# Patient Record
Sex: FEMALE | Race: WHITE | NOT HISPANIC OR LATINO | Employment: FULL TIME | URBAN - NONMETROPOLITAN AREA
[De-identification: names, ages, dates, MRNs, and addresses within clinical notes are randomized per-mention and may not be internally consistent; named-entity substitution may affect disease eponyms.]

---

## 2019-03-27 ENCOUNTER — OFFICE VISIT (OUTPATIENT)
Dept: URGENT CARE | Facility: PHYSICIAN GROUP | Age: 29
End: 2019-03-27
Payer: COMMERCIAL

## 2019-03-27 VITALS
BODY MASS INDEX: 20.76 KG/M2 | OXYGEN SATURATION: 99 % | RESPIRATION RATE: 16 BRPM | TEMPERATURE: 99.3 F | SYSTOLIC BLOOD PRESSURE: 114 MMHG | HEIGHT: 68 IN | WEIGHT: 137 LBS | HEART RATE: 82 BPM | DIASTOLIC BLOOD PRESSURE: 76 MMHG

## 2019-03-27 DIAGNOSIS — J02.9 ACUTE PHARYNGITIS, UNSPECIFIED ETIOLOGY: ICD-10-CM

## 2019-03-27 DIAGNOSIS — Z20.818 EXPOSURE TO STREP THROAT: ICD-10-CM

## 2019-03-27 LAB
INT CON NEG: NEGATIVE
INT CON POS: POSITIVE
S PYO AG THROAT QL: NEGATIVE

## 2019-03-27 PROCEDURE — 99203 OFFICE O/P NEW LOW 30 MIN: CPT | Performed by: FAMILY MEDICINE

## 2019-03-27 PROCEDURE — 87880 STREP A ASSAY W/OPTIC: CPT | Performed by: FAMILY MEDICINE

## 2019-03-27 RX ORDER — AZITHROMYCIN 250 MG/1
TABLET, FILM COATED ORAL
Qty: 6 TAB | Refills: 0 | Status: SHIPPED | OUTPATIENT
Start: 2019-03-27

## 2019-03-27 NOTE — LETTER
March 27, 2019         Patient: Alyson Shea   YOB: 1990   Date of Visit: 3/27/2019           To Whom it May Concern:    Alyson Shea was seen in my clinic on 3/27/2019.     Please excuse from work for 3/28/19 due to medical condition.    If you have any questions or concerns, please don't hesitate to call.        Sincerely,           Viral Reid M.D.  Electronically Signed

## 2019-03-28 NOTE — PROGRESS NOTES
Chief Complaint:    Chief Complaint   Patient presents with   • Laryngitis   • Pharyngitis       History of Present Illness:    This is a new problem. She started with sore throat and voice hoarseness on 3/23/19, overall at least moderate severity, and not getting better. She is in very close contact with co-worker who tested positive for Strep throat yesterday. [employer] works/tolerates.      Review of Systems:    Constitutional: Negative for fever, chills, and diaphoresis.   Eyes: Negative for change in vision, photophobia, pain, redness, and discharge.  ENT: See HPI.    Respiratory: Negative for cough, hemoptysis, sputum production, shortness of breath, wheezing, and stridor.    Cardiovascular: Negative for chest pain, palpitations, orthopnea, claudication, leg swelling, and PND.   Gastrointestinal: Negative for abdominal pain, nausea, vomiting, diarrhea, constipation, blood in stool, and melena.   Musculoskeletal: Negative for myalgias, joint pain, neck pain, and back pain.   Skin: Negative for rash and itching.   Neurological: Negative for dizziness, tingling, tremors, sensory change, speech change, focal weakness, seizures, loss of consciousness, and headaches.       Past Medical History:    History reviewed. No pertinent past medical history.    Past Surgical History:    History reviewed. No pertinent surgical history.    Social History:    Social History     Social History   • Marital status: Single     Spouse name: N/A   • Number of children: N/A   • Years of education: N/A     Occupational History   • Not on file.     Social History Main Topics   • Smoking status: Never Smoker   • Smokeless tobacco: Never Used   • Alcohol use Yes      Comment: occ   • Drug use: No   • Sexual activity: Not on file     Other Topics Concern   • Not on file     Social History Narrative   • No narrative on file     Family History:    History reviewed. No pertinent family history.    Medications:    No current outpatient  "prescriptions on file prior to visit.     No current facility-administered medications on file prior to visit.        Allergies:    Allergies   Allergen Reactions   • Amoxicillin    • Codeine    • Flexeril [Cyclobenzaprine]    • Pcn [Penicillins]        Vitals:    Vitals:    03/27/19 1756   BP: 114/76   Pulse: 82   Resp: 16   Temp: 37.4 °C (99.3 °F)   TempSrc: Temporal   SpO2: 99%   Weight: 62.1 kg (137 lb)   Height: 1.727 m (5' 8\")       Physical Exam:    Constitutional: Vital signs reviewed. Appears well-developed and well-nourished. Hoarse. No acute distress.   Eyes: Sclera white, conjunctivae clear.   ENT: External ears normal. Hearing normal. Nasal mucosa pink. Lips/teeth are normal. Oral mucosa pink and moist. Posterior pharynx is moderately erythematous without exudate.  Pulmonary/Chest: Respirations non-labored.   Lymph: Cervical nodes without tenderness or enlargement.  Musculoskeletal: Normal gait. Normal range of motion. No muscular atrophy or weakness.  Skin: No rashes or lesions. Warm, dry, normal turgor.  Psychiatric: Normal mood and affect. Behavior is normal. Judgment and thought content normal.     Diagnostics:    POCT RAPID STREP A (Order #[id number]) on 3/27/19   Component Results     Component   Rapid Strep Screen   negative    Internal Control Positive   Positive    Internal Control Negative   Negative    Last Resulted Time   Wed Mar 27, 2019  7:03 PM       Assessment / Plan:    1. Acute pharyngitis, unspecified etiology  - POCT Rapid Strep A  - azithromycin (ZITHROMAX) 250 MG Tab; 2 TABS BY MOUTH ON DAY 1, 1 TAB ON DAYS 2-5.  Dispense: 6 Tab; Refill: 0    2. Exposure to strep throat  - POCT Rapid Strep A  - azithromycin (ZITHROMAX) 250 MG Tab; 2 TABS BY MOUTH ON DAY 1, 1 TAB ON DAYS 2-5.  Dispense: 6 Tab; Refill: 0      Work note given - excuse for 3/28/19.    Discussed with her limitations of Rapid Strep test (possible false negative, only testing for Strep A), DDX, management options, and " risks, benefits, and alternatives to treatment plan agreed upon.    Despite negative Rapid Strep test, offered antibiotic treatment due to duration and severity of symptoms, exam findings, and exposed to co-worker who tested positive for Strep throat yesterday and she has very close contact with this person. She would like.    Agreeable to medication prescribed.    Discussed expected course of duration, time for improvement, and to seek follow-up in Emergency Room, urgent care, or with PCP if getting worse at any time or not improving within expected time frame.

## 2019-11-22 ENCOUNTER — OFFICE VISIT (OUTPATIENT)
Dept: URGENT CARE | Facility: PHYSICIAN GROUP | Age: 29
End: 2019-11-22
Payer: COMMERCIAL

## 2019-11-22 ENCOUNTER — APPOINTMENT (OUTPATIENT)
Dept: RADIOLOGY | Facility: IMAGING CENTER | Age: 29
End: 2019-11-22
Attending: NURSE PRACTITIONER
Payer: COMMERCIAL

## 2019-11-22 VITALS
TEMPERATURE: 97.5 F | RESPIRATION RATE: 14 BRPM | OXYGEN SATURATION: 96 % | BODY MASS INDEX: 22.43 KG/M2 | WEIGHT: 148 LBS | SYSTOLIC BLOOD PRESSURE: 118 MMHG | DIASTOLIC BLOOD PRESSURE: 80 MMHG | HEIGHT: 68 IN | HEART RATE: 100 BPM

## 2019-11-22 DIAGNOSIS — R68.84 JAW PAIN: ICD-10-CM

## 2019-11-22 DIAGNOSIS — M25.512 CHRONIC LEFT SHOULDER PAIN: ICD-10-CM

## 2019-11-22 DIAGNOSIS — G89.29 CHRONIC LEFT SHOULDER PAIN: ICD-10-CM

## 2019-11-22 PROCEDURE — 99214 OFFICE O/P EST MOD 30 MIN: CPT | Performed by: NURSE PRACTITIONER

## 2019-11-22 PROCEDURE — 70100 X-RAY EXAM OF JAW <4VIEWS: CPT | Mod: TC,FY,RT | Performed by: NURSE PRACTITIONER

## 2019-11-22 PROCEDURE — 73030 X-RAY EXAM OF SHOULDER: CPT | Mod: TC,FY,LT | Performed by: NURSE PRACTITIONER

## 2019-11-22 NOTE — PROGRESS NOTES
Subjective:      Alyson Shea is a 29 y.o. female who presents with Jaw Pain (poss dislocation right side x1 week) and Shoulder Pain (poss dislocation x 2 years )    History reviewed. No pertinent past medical history.  Social History     Socioeconomic History   • Marital status: Single     Spouse name: Not on file   • Number of children: Not on file   • Years of education: Not on file   • Highest education level: Not on file   Occupational History   • Not on file   Social Needs   • Financial resource strain: Not on file   • Food insecurity:     Worry: Not on file     Inability: Not on file   • Transportation needs:     Medical: Not on file     Non-medical: Not on file   Tobacco Use   • Smoking status: Never Smoker   • Smokeless tobacco: Never Used   Substance and Sexual Activity   • Alcohol use: Yes     Comment: occ   • Drug use: No   • Sexual activity: Not on file   Lifestyle   • Physical activity:     Days per week: Not on file     Minutes per session: Not on file   • Stress: Not on file   Relationships   • Social connections:     Talks on phone: Not on file     Gets together: Not on file     Attends [employer] service: Not on file     Active member of club or organization: Not on file     Attends meetings of clubs or organizations: Not on file     Relationship status: Not on file   • Intimate partner violence:     Fear of current or ex partner: Not on file     Emotionally abused: Not on file     Physically abused: Not on file     Forced sexual activity: Not on file   Other Topics Concern   • Not on file   Social History Narrative   • Not on file     History reviewed. No pertinent family history.    Allergies: Amoxicillin; Codeine; Flexeril [cyclobenzaprine]; and Pcn [penicillins]    Patient is a 29-year-old female who presents today with 2 complaints.  First complaint is chronic left shoulder pain over the last 2 years.  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  States that she was doing a cartwheel and thinks she injured her shoulder at that time.  That was 2 years ago and she is continued to have intermittent pain since.    Secondly, over the last 2 to 3 weeks patient has been having right jaw pain.  States that she is concerned for injury versus dislocation.  No trauma, states she was eating a hard shell taco and felt a crack in her jaw and has had pain since.          Shoulder Pain   This is a chronic problem. The current episode started more than 1 year ago. The problem occurs constantly. The problem has been unchanged. Nothing aggravates the symptoms. She has tried nothing for the symptoms. The treatment provided no relief.       Review of Systems   Musculoskeletal:        Jaw pain  Shoulder pain   All other systems reviewed and are negative.         Objective:     /80 (BP Location: Left arm, Patient Position: Sitting, BP Cuff Size: Adult)   Pulse 100   Temp 36.4 °C (97.5 °F) (Temporal)   Resp 14   Ht 1.727 m (5' 8\")   Wt 67.1 kg (148 lb)   SpO2 96%   BMI 22.50 kg/m²      Physical Exam  Vitals signs reviewed.   Constitutional:       Appearance: Normal appearance.   HENT:      Head:        Comments: Localized STS and point tenderness.  Mild pain with biting down.  No obvious deformity.   Neck:      Musculoskeletal: Normal range of motion and neck supple.   Musculoskeletal:        Arms:       Comments: Mild point tenderness to the anterior and upper posterior left shoulder.  No obvious deformity or dislocation noted.   5/5 and equal in the upper extremities.  Push/pull 5/5 and equal in the upper extremities.  Patient is not able to abduct greater than 90 degrees anteriorly or laterally.  No decreased range of motion posteriorly.   Neurological:      Mental Status: She is alert.       xr shoulder:    11/22/2019 3:31 PM     HISTORY/REASON FOR EXAM:  Atraumatic Pain/Swelling/Deformity        TECHNIQUE/EXAM DESCRIPTION AND " NUMBER OF VIEWS:  3 views of the LEFT shoulder.     COMPARISON: None     FINDINGS:  There is no evidence of fracture or dislocation. Glenohumeral and acromioclavicular articulation is maintained.  No periosteal new bone formation or osseous erosion is identified. Visualized left lung field is clear.        IMPRESSION:     No evidence of acute fracture or dislocation.    xr jaw:            11/22/2019 3:31 PM     HISTORY/REASON FOR EXAM:  Jaw Pain.        TECHNIQUE/EXAM DESCRIPTION AND NUMBER OF VIEWS:  3 view(s) of the mandible.     COMPARISON:  None.     FINDINGS:  No fracture or dislocation is identified. No lytic or sclerotic lesions are identified. Mastoid air cells are well aerated. There is no significant deviation of the nasal septum.     IMPRESSION:     No acute abnormality is identified.     If pain persists, further evaluation can be obtained with MRI of the TMJ.       Assessment/Plan:     Chronic left shoulder pain  Right jaw pain    Ibuprofen as needed  Rest  MRI of shoulder ordered  Referral given to orthopedics at patient's request.  There are no diagnoses linked to this encounter.

## 2019-12-10 ENCOUNTER — HOSPITAL ENCOUNTER (OUTPATIENT)
Dept: RADIOLOGY | Facility: MEDICAL CENTER | Age: 29
End: 2019-12-10
Attending: NURSE PRACTITIONER
Payer: COMMERCIAL

## 2019-12-10 DIAGNOSIS — M25.512 CHRONIC LEFT SHOULDER PAIN: ICD-10-CM

## 2019-12-10 DIAGNOSIS — G89.29 CHRONIC LEFT SHOULDER PAIN: ICD-10-CM

## 2019-12-10 PROCEDURE — 73221 MRI JOINT UPR EXTREM W/O DYE: CPT | Mod: LT

## 2023-06-08 ENCOUNTER — HOSPITAL ENCOUNTER (EMERGENCY)
Facility: MEDICAL CENTER | Age: 33
End: 2023-06-08
Attending: EMERGENCY MEDICINE
Payer: COMMERCIAL

## 2023-06-08 ENCOUNTER — APPOINTMENT (OUTPATIENT)
Dept: RADIOLOGY | Facility: MEDICAL CENTER | Age: 33
End: 2023-06-08
Attending: EMERGENCY MEDICINE
Payer: COMMERCIAL

## 2023-06-08 VITALS
WEIGHT: 158.51 LBS | DIASTOLIC BLOOD PRESSURE: 66 MMHG | HEIGHT: 68 IN | OXYGEN SATURATION: 96 % | HEART RATE: 76 BPM | SYSTOLIC BLOOD PRESSURE: 116 MMHG | TEMPERATURE: 98 F | BODY MASS INDEX: 24.02 KG/M2 | RESPIRATION RATE: 20 BRPM

## 2023-06-08 DIAGNOSIS — R11.2 NAUSEA AND VOMITING, UNSPECIFIED VOMITING TYPE: ICD-10-CM

## 2023-06-08 DIAGNOSIS — R10.9 FLANK PAIN: ICD-10-CM

## 2023-06-08 DIAGNOSIS — R10.31 RIGHT LOWER QUADRANT ABDOMINAL PAIN: ICD-10-CM

## 2023-06-08 DIAGNOSIS — N20.0 NEPHROLITHIASIS: Primary | ICD-10-CM

## 2023-06-08 LAB
ALBUMIN SERPL BCP-MCNC: 4.4 G/DL (ref 3.2–4.9)
ALBUMIN/GLOB SERPL: 1.5 G/DL
ALP SERPL-CCNC: 71 U/L (ref 30–99)
ALT SERPL-CCNC: 14 U/L (ref 2–50)
AMORPH CRY #/AREA URNS HPF: PRESENT /HPF
ANION GAP SERPL CALC-SCNC: 15 MMOL/L (ref 7–16)
APPEARANCE UR: ABNORMAL
AST SERPL-CCNC: 12 U/L (ref 12–45)
BACTERIA #/AREA URNS HPF: ABNORMAL /HPF
BASOPHILS # BLD AUTO: 0.4 % (ref 0–1.8)
BASOPHILS # BLD: 0.03 K/UL (ref 0–0.12)
BILIRUB SERPL-MCNC: 0.5 MG/DL (ref 0.1–1.5)
BILIRUB UR QL STRIP.AUTO: NEGATIVE
BUN SERPL-MCNC: 16 MG/DL (ref 8–22)
CALCIUM ALBUM COR SERPL-MCNC: 9 MG/DL (ref 8.5–10.5)
CALCIUM SERPL-MCNC: 9.3 MG/DL (ref 8.5–10.5)
CHLORIDE SERPL-SCNC: 104 MMOL/L (ref 96–112)
CO2 SERPL-SCNC: 21 MMOL/L (ref 20–33)
COLOR UR: YELLOW
CREAT SERPL-MCNC: 0.86 MG/DL (ref 0.5–1.4)
EOSINOPHIL # BLD AUTO: 0.01 K/UL (ref 0–0.51)
EOSINOPHIL NFR BLD: 0.1 % (ref 0–6.9)
ERYTHROCYTE [DISTWIDTH] IN BLOOD BY AUTOMATED COUNT: 40.1 FL (ref 35.9–50)
GFR SERPLBLD CREATININE-BSD FMLA CKD-EPI: 91 ML/MIN/1.73 M 2
GLOBULIN SER CALC-MCNC: 2.9 G/DL (ref 1.9–3.5)
GLUCOSE SERPL-MCNC: 114 MG/DL (ref 65–99)
GLUCOSE UR STRIP.AUTO-MCNC: NEGATIVE MG/DL
HCG SERPL QL: NEGATIVE
HCT VFR BLD AUTO: 42.6 % (ref 37–47)
HGB BLD-MCNC: 13.8 G/DL (ref 12–16)
IMM GRANULOCYTES # BLD AUTO: 0.01 K/UL (ref 0–0.11)
IMM GRANULOCYTES NFR BLD AUTO: 0.1 % (ref 0–0.9)
KETONES UR STRIP.AUTO-MCNC: NEGATIVE MG/DL
LEUKOCYTE ESTERASE UR QL STRIP.AUTO: NEGATIVE
LIPASE SERPL-CCNC: 29 U/L (ref 11–82)
LYMPHOCYTES # BLD AUTO: 1.48 K/UL (ref 1–4.8)
LYMPHOCYTES NFR BLD: 20 % (ref 22–41)
MCH RBC QN AUTO: 29.3 PG (ref 27–33)
MCHC RBC AUTO-ENTMCNC: 32.4 G/DL (ref 32.2–35.5)
MCV RBC AUTO: 90.4 FL (ref 81.4–97.8)
MICRO URNS: ABNORMAL
MONOCYTES # BLD AUTO: 0.48 K/UL (ref 0–0.85)
MONOCYTES NFR BLD AUTO: 6.5 % (ref 0–13.4)
NEUTROPHILS # BLD AUTO: 5.39 K/UL (ref 1.82–7.42)
NEUTROPHILS NFR BLD: 72.9 % (ref 44–72)
NITRITE UR QL STRIP.AUTO: NEGATIVE
NRBC # BLD AUTO: 0 K/UL
NRBC BLD-RTO: 0 /100 WBC (ref 0–0.2)
PH UR STRIP.AUTO: 8.5 [PH] (ref 5–8)
PLATELET # BLD AUTO: 311 K/UL (ref 164–446)
PMV BLD AUTO: 9.7 FL (ref 9–12.9)
POTASSIUM SERPL-SCNC: 3.9 MMOL/L (ref 3.6–5.5)
PROT SERPL-MCNC: 7.3 G/DL (ref 6–8.2)
PROT UR QL STRIP: NEGATIVE MG/DL
RBC # BLD AUTO: 4.71 M/UL (ref 4.2–5.4)
RBC # URNS HPF: ABNORMAL /HPF
RBC UR QL AUTO: ABNORMAL
SODIUM SERPL-SCNC: 140 MMOL/L (ref 135–145)
SP GR UR STRIP.AUTO: 1.01
UROBILINOGEN UR STRIP.AUTO-MCNC: 0.2 MG/DL
WBC # BLD AUTO: 7.4 K/UL (ref 4.8–10.8)
WBC #/AREA URNS HPF: ABNORMAL /HPF

## 2023-06-08 PROCEDURE — 74177 CT ABD & PELVIS W/CONTRAST: CPT

## 2023-06-08 PROCEDURE — 700111 HCHG RX REV CODE 636 W/ 250 OVERRIDE (IP): Performed by: EMERGENCY MEDICINE

## 2023-06-08 PROCEDURE — 81001 URINALYSIS AUTO W/SCOPE: CPT

## 2023-06-08 PROCEDURE — 80053 COMPREHEN METABOLIC PANEL: CPT

## 2023-06-08 PROCEDURE — 36415 COLL VENOUS BLD VENIPUNCTURE: CPT

## 2023-06-08 PROCEDURE — 700105 HCHG RX REV CODE 258: Performed by: EMERGENCY MEDICINE

## 2023-06-08 PROCEDURE — 700102 HCHG RX REV CODE 250 W/ 637 OVERRIDE(OP): Performed by: EMERGENCY MEDICINE

## 2023-06-08 PROCEDURE — 96374 THER/PROPH/DIAG INJ IV PUSH: CPT

## 2023-06-08 PROCEDURE — 700117 HCHG RX CONTRAST REV CODE 255: Performed by: EMERGENCY MEDICINE

## 2023-06-08 PROCEDURE — 84703 CHORIONIC GONADOTROPIN ASSAY: CPT

## 2023-06-08 PROCEDURE — 99285 EMERGENCY DEPT VISIT HI MDM: CPT

## 2023-06-08 PROCEDURE — 96375 TX/PRO/DX INJ NEW DRUG ADDON: CPT

## 2023-06-08 PROCEDURE — 83690 ASSAY OF LIPASE: CPT

## 2023-06-08 PROCEDURE — 96376 TX/PRO/DX INJ SAME DRUG ADON: CPT

## 2023-06-08 PROCEDURE — A9270 NON-COVERED ITEM OR SERVICE: HCPCS | Performed by: EMERGENCY MEDICINE

## 2023-06-08 PROCEDURE — 85025 COMPLETE CBC W/AUTO DIFF WBC: CPT

## 2023-06-08 RX ORDER — TAMSULOSIN HYDROCHLORIDE 0.4 MG/1
0.4 CAPSULE ORAL DAILY
Qty: 30 CAPSULE | Refills: 0 | Status: SHIPPED | OUTPATIENT
Start: 2023-06-08

## 2023-06-08 RX ORDER — OXYCODONE HYDROCHLORIDE AND ACETAMINOPHEN 5; 325 MG/1; MG/1
1 TABLET ORAL ONCE
Status: COMPLETED | OUTPATIENT
Start: 2023-06-08 | End: 2023-06-08

## 2023-06-08 RX ORDER — ONDANSETRON 2 MG/ML
4 INJECTION INTRAMUSCULAR; INTRAVENOUS ONCE
Status: COMPLETED | OUTPATIENT
Start: 2023-06-08 | End: 2023-06-08

## 2023-06-08 RX ORDER — ONDANSETRON 4 MG/1
4 TABLET, ORALLY DISINTEGRATING ORAL EVERY 6 HOURS PRN
Qty: 20 TABLET | Refills: 0 | Status: SHIPPED | OUTPATIENT
Start: 2023-06-08

## 2023-06-08 RX ORDER — OXYCODONE HYDROCHLORIDE AND ACETAMINOPHEN 5; 325 MG/1; MG/1
1 TABLET ORAL EVERY 4 HOURS PRN
Qty: 15 TABLET | Refills: 0 | Status: SHIPPED | OUTPATIENT
Start: 2023-06-08 | End: 2023-06-13

## 2023-06-08 RX ORDER — SODIUM CHLORIDE 9 MG/ML
1000 INJECTION, SOLUTION INTRAVENOUS ONCE
Status: COMPLETED | OUTPATIENT
Start: 2023-06-08 | End: 2023-06-08

## 2023-06-08 RX ORDER — KETOROLAC TROMETHAMINE 30 MG/ML
15 INJECTION, SOLUTION INTRAMUSCULAR; INTRAVENOUS ONCE
Status: COMPLETED | OUTPATIENT
Start: 2023-06-08 | End: 2023-06-08

## 2023-06-08 RX ORDER — NAPROXEN 500 MG/1
500 TABLET ORAL
Qty: 60 TABLET | Refills: 0 | Status: SHIPPED | OUTPATIENT
Start: 2023-06-08

## 2023-06-08 RX ADMIN — KETOROLAC TROMETHAMINE 15 MG: 30 INJECTION, SOLUTION INTRAMUSCULAR; INTRAVENOUS at 11:35

## 2023-06-08 RX ADMIN — OXYCODONE AND ACETAMINOPHEN 1 TABLET: 325; 5 TABLET ORAL at 14:03

## 2023-06-08 RX ADMIN — IOHEXOL 100 ML: 350 INJECTION, SOLUTION INTRAVENOUS at 11:00

## 2023-06-08 RX ADMIN — ONDANSETRON 4 MG: 2 INJECTION INTRAMUSCULAR; INTRAVENOUS at 10:21

## 2023-06-08 RX ADMIN — FENTANYL CITRATE 50 MCG: 50 INJECTION, SOLUTION INTRAMUSCULAR; INTRAVENOUS at 10:22

## 2023-06-08 RX ADMIN — FENTANYL CITRATE 50 MCG: 50 INJECTION, SOLUTION INTRAMUSCULAR; INTRAVENOUS at 11:00

## 2023-06-08 RX ADMIN — SODIUM CHLORIDE 1000 ML: 9 INJECTION, SOLUTION INTRAVENOUS at 11:01

## 2023-06-08 NOTE — ED TRIAGE NOTES
"Chief Complaint   Patient presents with    Abdominal Pain     RLQ radiating to R flank and R lower back starting this AM, endorses vomiting, states slight pain with urination this AM, states hx of kidney stones, denies fevers, LBM this AM, LMP two weeks ago, denies hx of abd surgeries      Pt ambulatory to triage for above complaints, VSS on RA, gCS 15, NAD.    Pt returned to [facility]. Educated on triage process and to inform staff of any changes.     /81   Pulse 79   Temp 36.7 °C (98.1 °F) (Temporal)   Resp 20   Ht 1.727 m (5' 8\")   Wt 71.9 kg (158 lb 8.2 oz)   SpO2 99%   BMI 24.10 kg/m²     "

## 2023-06-08 NOTE — ED NOTES
Patient put on monitor (blood pressure, pulse ox, and cardiac leads) and placed in position of comfort. Call light placed within reach of patient.

## 2023-06-08 NOTE — ED PROVIDER NOTES
ED Provider Note    Scribed for Neli Palacios M.D. by Lian Silverio. 6/8/2023, 9:58 AM.    Primary care provider: Pcp Pt States None  Means of arrival: The patient was brought in by her fiance.   History obtained from: The patient.  History limited by: None.    CHIEF COMPLAINT  Chief Complaint   Patient presents with    Abdominal Pain     RLQ radiating to R flank and R lower back starting this AM, endorses vomiting, states slight pain with urination this AM, states hx of kidney stones, denies fevers, LBM this AM, LMP two weeks ago, denies hx of abd surgeries        HPI/ROS  Alyson Shea is a 33 y.o. female who presents to the Emergency Department for evaluation of abdominal pain onset this morning.  She states that the pain starts in the right lower quadrant and radiates to her right flank. The patient has a history of kidney stones, but she notes that the pain she has today feels slightly different.  She reports associated nausea and vomiting.  Did not require any surgical interventions for kidney stones in the past.  The patient denies any history of abdominal surgeries.     EXTERNAL RECORDS REVIEWED  None.     LIMITATION TO HISTORY   Select: : None    OUTSIDE HISTORIAN(S):  Significant other  at bedside to confirm sequence of events and collateral information provided.     PAST MEDICAL HISTORY   The patient has a history of kidney stones.     SURGICAL HISTORY  patient denies any surgical history    SOCIAL HISTORY  Social History     Tobacco Use    Smoking status: Never    Smokeless tobacco: Never   Vaping Use    Vaping Use: Never used   Substance Use Topics    Alcohol use: Yes     Comment: occ    Drug use: No      Social History     Substance and Sexual Activity   Drug Use No     FAMILY HISTORY  No family history pertinent.     CURRENT MEDICATIONS  Home Medications       Reviewed by Zander Anna R.N. (Registered Nurse) on 06/08/23 at 0931  Med List Status: Not Addressed     Medication  "Last Dose Status   azithromycin (ZITHROMAX) 250 MG Tab  Active                  ALLERGIES  Allergies   Allergen Reactions    Amoxicillin     Codeine     Flexeril [Cyclobenzaprine]     Pcn [Penicillins]      PHYSICAL EXAM  VITAL SIGNS: /81   Pulse 79   Temp 36.7 °C (98.1 °F) (Temporal)   Resp 20   Ht 1.727 m (5' 8\")   Wt 71.9 kg (158 lb 8.2 oz)   SpO2 99%   BMI 24.10 kg/m²   Vitals reviewed by myself.  Physical Exam  Nursing note and vitals reviewed.  Constitutional: Well-developed and well-nourished.  Peers uncomfortable  HENT: Head is normocephalic and atraumatic.  Eyes: extra-ocular movements intact  Cardiovascular: Normal rate and normal regular rhythm. No murmur heard.  Pulmonary/Chest: Breath sounds normal. No wheezes or rales.   Abdominal: Soft and non-tender. No distention. Mild right lower quadrant tenderness with right CVA tenderness  Musculoskeletal: Extremities exhibit normal range of motion without edema or tenderness.   Neurological: Awake and alert  Skin: Skin is warm and dry. No rash.      DIAGNOSTIC STUDIES:  LABS  Labs Reviewed   CBC WITH DIFFERENTIAL - Abnormal; Notable for the following components:       Result Value    Neutrophils-Polys 72.90 (*)     Lymphocytes 20.00 (*)     All other components within normal limits   COMP METABOLIC PANEL - Abnormal; Notable for the following components:    Glucose 114 (*)     All other components within normal limits   URINALYSIS,CULTURE IF INDICATED - Abnormal; Notable for the following components:    Character Turbid (*)     Ph 8.5 (*)     Occult Blood Large (*)     All other components within normal limits    Narrative:     Indication for culture:->Patient WITHOUT an indwelling Ashraf  catheter in place with new onset of Dysuria, Frequency,  Urgency, and/or Suprapubic pain   URINE MICROSCOPIC (W/UA) - Abnormal; Notable for the following components:    RBC 10-20 (*)     Bacteria Few (*)     All other components within normal limits    Narrative:  "    Indication for culture:->Patient WITHOUT an indwelling Ashraf  catheter in place with new onset of Dysuria, Frequency,  Urgency, and/or Suprapubic pain   LIPASE   HCG QUAL SERUM   CORRECTED CALCIUM   ESTIMATED GFR     All labs reviewed and independently interpreted by myself    RADIOLOGY  Images independently interpreted by myself prior to radiologist review:  -CT notable for nephrolithiasis    Final interpretation by radiology demonstrates:    CT-ABDOMEN-PELVIS WITH   Final Result      1.  The ureter is somewhat poorly visualized in the pelvis, though there appears to be a 3 mm obstructing stone in the distal right ureter.   2.  Mild right hydronephrosis. Right renal inflammatory changes. Correlate with urinalysis for acute infection.      Study unavailable for interpretation until 1:08 PM on 56/8/2023due to unknown issue with hospital PACS system.        The radiologist's interpretation of all radiological studies have been reviewed by me.    COURSE & MEDICAL DECISION MAKING    ED Observation Status? Yes; I am placing the patient in to an observation status due to a diagnostic uncertainty as well as therapeutic intensity. Patient placed in observation status at 10:00 AM, 6/8/2023.     Observation plan is as follows: Will perform lab work and imaging for further evaluation of her symptoms.     Upon Reevaluation, the patient's condition has: Improved; and will be discharged.    Patient discharged from ED Observation status at 1:48 PM (Time) 6/8/2023 (Date).     INITIAL ASSESSMENT AND PLAN    Patient is a 33-year-old female who presents for evaluation of right-sided abdominal pain radiating to the flank.  Differential diagnosis includes nephrolithiasis, hydronephrosis, acute kidney injury, urinary tract infection, appendicitis.  Diagnostic work-up includes labs and CT of the abdomen.       REASSESSMENTS     9:59 AM - Patient seen and examined at bedside. Discussed plan of care with the patient. Patient agrees to  the plan of care.     10:41 AM - Patient was reevaluated at bedside. The patient's pain is slightly improved, but states that it is still a 5/10 in severity.     HYDRATION: Based on the patient's presentation of Acute Vomiting and Other Nausea the patient was given IV fluids. IV Hydration was used because oral hydration was not adequate alone. Upon recheck following hydration, the patient was improved.    10:44 AM - Patient was reevaluated at bedside. She reports that her pain has improved.     1:36 PM - Patient was reevaluated at bedside. Discussed lab and radiology results with the patient. Discussed discharge instructions and return precautions with the patient and they were cleared for discharge.     ER COURSE AND FINAL DISPOSITION   Patient's initial vitals are within normal limits.  On exam she appears uncomfortable with right flank and right lower quadrant abdominal tenderness.  She is treated with fentanyl and Zofran.  Despite this her pain returns and she is again treated with fentanyl and Toradol.  Patient is also given IV fluids given nausea and vomiting and decreased oral intake.  After treatment patient is feeling improved and is able to urinate.  Labs returned and are independently interpreted by myself to demonstrate:  -Electrolytes and renal function are within normal limits  -Urinalysis demonstrates no signs of infection, there is blood in the urine consistent with likely nephrolithiasis  -No leukocytosis  -Lipase is within normal limits ruling out pancreatitis  -Labs otherwise unremarkable    CT returns and demonstrates right-sided nephrolithiasis with a 3 mm stone in the right distal ureter which is consistent with her symptoms.  Given the size of the stone I advised patient that she will likely pass this.  Her pain has greatly improved, therefore at this time I will discharge her with close follow-up instructions.  She is amenable to this plan.  She is advised to follow-up with [facility] [place] if  her symptoms do not improve and advised to return to the emergency department if symptoms worsen.  She is provided with prescriptions for Percocet, naproxen, Flomax and Zofran for management of her symptoms at home.  She is then given strict return precautions and discharged in stable condition.    ADDITIONAL PROBLEM LIST AND RESOURCE UTILIZATION    Additional problems aside from the chief complaint that I have addressed: None    I have discussed management of the patient with the following physicians and MONET's: None.    Discussion of management with other QHP or appropriate source(s): None     Escalation of care considered, and ultimately not performed: See above.     Barriers to care at this time, including but not limited to: Patient does not have established PCP.     Decision tools and prescription drugs considered including, but not limited to: See above.    Please see review of records as noted above    Narcotics Attestation    I reviewed prescription monitoring program for patient's narcotic use before prescribing a scheduled drug.The patient will not drink alcohol nor drive with prescribed medications. The patient will return for new or worsening symptoms and is stable at the time of discharge.    In prescribing controlled substances to this patient, I certify that I have obtained and reviewed the medical history of Alyson Shea. I have also made a good skinny effort to obtain applicable records from other providers who have treated the patient and records did not demonstrate any increased risk of substance abuse that would prevent me from prescribing controlled substances.     I have conducted a physical exam and documented it. I have reviewed Ms. Shea’s prescription history as maintained by the [place] Prescription Monitoring Program.     I have assessed the patient’s risk for abuse, dependency, and addiction using the validated Opioid Risk Tool available at  https://www.mdcalc.com/nunozj-gfai-ribn-ort-narcotic-abuse.     Given the above, I believe the benefits of controlled substance therapy outweigh the risks. The reasons for prescribing controlled substances include non-narcotic, oral analgesic alternatives have been inadequate for pain control. Accordingly, I have discussed the risk and benefits, treatment plan, and alternative therapies with the patient.      DISPOSITION:  Patient will be discharged home in stable condition.    FOLLOW UP:  [facility] [place]  [address] [place] [address]  [phone]    Schedule an appointment as soon as possible for a visit   If symptoms worsen    OUTPATIENT MEDICATIONS:  New Prescriptions    NAPROXEN (NAPROSYN) 500 MG TAB    Take 1 Tablet by mouth 2 times daily with meals as needed (pain).    ONDANSETRON (ZOFRAN ODT) 4 MG TABLET DISPERSIBLE    Take 1 Tablet by mouth every 6 hours as needed for Nausea/Vomiting.    OXYCODONE-ACETAMINOPHEN (PERCOCET) 5-325 MG TAB    Take 1 Tablet by mouth every four hours as needed for Severe Pain or Moderate Pain for up to 5 days.    TAMSULOSIN (FLOMAX) 0.4 MG CAPSULE    Take 1 Capsule by mouth every day.      FINAL IMPRESSION  1. Nephrolithiasis    2. Right lower quadrant abdominal pain    3. Nausea and vomiting, unspecified vomiting type    4. Flank pain        Lian ESPINOSA (Scribe), am scribing for, and in the presence of, Neli Palacios M.D..    Electronically signed by: Lian Silverio (Scribe), 6/8/2023    INeli M.D. personally performed the services described in this documentation, as scribed by Lian Silverio in my presence, and it is both accurate and complete.    The note accurately reflects work and decisions made by me.  Neli Palacios M.D.  6/8/2023  2:34 PM

## 2023-06-08 NOTE — ED NOTES
"Pt discharged to home w/ steady gait. Pt A&ox4 on RA. IV discontinued and gauze placed, pt in possession of belongings. Pt provided discharge education and information pertaining to medications and follow up appointments. Pt received copy of discharge instructions and verbalized understanding. /66   Pulse 76   Temp 36.7 °C (98 °F) (Temporal)   Resp 20   Ht 1.727 m (5' 8\")   Wt 71.9 kg (158 lb 8.2 oz)   SpO2 96%   BMI 24.10 kg/m²    "

## 2023-09-23 ENCOUNTER — HOSPITAL ENCOUNTER (OUTPATIENT)
Dept: LAB | Facility: MEDICAL CENTER | Age: 33
End: 2023-09-23
Attending: FAMILY MEDICINE
Payer: COMMERCIAL

## 2023-09-23 LAB
ALBUMIN SERPL BCP-MCNC: 4.4 G/DL (ref 3.2–4.9)
ALBUMIN/GLOB SERPL: 1.4 G/DL
ALP SERPL-CCNC: 74 U/L (ref 30–99)
ALT SERPL-CCNC: 19 U/L (ref 2–50)
ANION GAP SERPL CALC-SCNC: 12 MMOL/L (ref 7–16)
APPEARANCE UR: CLEAR
APTT PPP: 31.5 SEC (ref 24.7–36)
AST SERPL-CCNC: 16 U/L (ref 12–45)
BACTERIA #/AREA URNS HPF: ABNORMAL /HPF
BASOPHILS # BLD AUTO: 0.4 % (ref 0–1.8)
BASOPHILS # BLD: 0.03 K/UL (ref 0–0.12)
BILIRUB SERPL-MCNC: 0.5 MG/DL (ref 0.1–1.5)
BILIRUB UR QL STRIP.AUTO: NEGATIVE
BUN SERPL-MCNC: 15 MG/DL (ref 8–22)
CALCIUM ALBUM COR SERPL-MCNC: 9 MG/DL (ref 8.5–10.5)
CALCIUM SERPL-MCNC: 9.3 MG/DL (ref 8.5–10.5)
CHLORIDE SERPL-SCNC: 102 MMOL/L (ref 96–112)
CHOLEST SERPL-MCNC: 220 MG/DL (ref 100–199)
CO2 SERPL-SCNC: 23 MMOL/L (ref 20–33)
COLOR UR: YELLOW
CREAT SERPL-MCNC: 0.72 MG/DL (ref 0.5–1.4)
EOSINOPHIL # BLD AUTO: 0.01 K/UL (ref 0–0.51)
EOSINOPHIL NFR BLD: 0.1 % (ref 0–6.9)
EPI CELLS #/AREA URNS HPF: ABNORMAL /HPF
ERYTHROCYTE [DISTWIDTH] IN BLOOD BY AUTOMATED COUNT: 41.2 FL (ref 35.9–50)
EST. AVERAGE GLUCOSE BLD GHB EST-MCNC: 120 MG/DL
FASTING STATUS PATIENT QL REPORTED: NORMAL
GFR SERPLBLD CREATININE-BSD FMLA CKD-EPI: 113 ML/MIN/1.73 M 2
GLOBULIN SER CALC-MCNC: 3.2 G/DL (ref 1.9–3.5)
GLUCOSE SERPL-MCNC: 93 MG/DL (ref 65–99)
GLUCOSE UR STRIP.AUTO-MCNC: NEGATIVE MG/DL
HBA1C MFR BLD: 5.8 % (ref 4–5.6)
HCT VFR BLD AUTO: 42.5 % (ref 37–47)
HDLC SERPL-MCNC: 58 MG/DL
HGB BLD-MCNC: 13.7 G/DL (ref 12–16)
HYALINE CASTS #/AREA URNS LPF: ABNORMAL /LPF
IMM GRANULOCYTES # BLD AUTO: 0.02 K/UL (ref 0–0.11)
IMM GRANULOCYTES NFR BLD AUTO: 0.3 % (ref 0–0.9)
INR PPP: 1 (ref 0.87–1.13)
KETONES UR STRIP.AUTO-MCNC: NEGATIVE MG/DL
LA PPP-IMP: NORMAL
LDLC SERPL CALC-MCNC: 136 MG/DL
LEUKOCYTE ESTERASE UR QL STRIP.AUTO: ABNORMAL
LMWH PPP CHRO-ACNC: <0.1 U/ML
LYMPHOCYTES # BLD AUTO: 1.87 K/UL (ref 1–4.8)
LYMPHOCYTES NFR BLD: 24.2 % (ref 22–41)
MCH RBC QN AUTO: 29.2 PG (ref 27–33)
MCHC RBC AUTO-ENTMCNC: 32.2 G/DL (ref 32.2–35.5)
MCV RBC AUTO: 90.6 FL (ref 81.4–97.8)
MICRO URNS: ABNORMAL
MONOCYTES # BLD AUTO: 0.44 K/UL (ref 0–0.85)
MONOCYTES NFR BLD AUTO: 5.7 % (ref 0–13.4)
NEUTROPHILS # BLD AUTO: 5.36 K/UL (ref 1.82–7.42)
NEUTROPHILS NFR BLD: 69.3 % (ref 44–72)
NITRITE UR QL STRIP.AUTO: NEGATIVE
NRBC # BLD AUTO: 0 K/UL
NRBC BLD-RTO: 0 /100 WBC (ref 0–0.2)
PH UR STRIP.AUTO: 6 [PH] (ref 5–8)
PLATELET # BLD AUTO: 304 K/UL (ref 164–446)
PMV BLD AUTO: 10.1 FL (ref 9–12.9)
POTASSIUM SERPL-SCNC: 4 MMOL/L (ref 3.6–5.5)
PROT SERPL-MCNC: 7.6 G/DL (ref 6–8.2)
PROT UR QL STRIP: NEGATIVE MG/DL
PROTHROMBIN TIME: 13.3 SEC (ref 12–14.6)
RBC # BLD AUTO: 4.69 M/UL (ref 4.2–5.4)
RBC # URNS HPF: ABNORMAL /HPF
RBC UR QL AUTO: NEGATIVE
SCREEN DRVVT: 38.7 SEC (ref 28–48)
SODIUM SERPL-SCNC: 137 MMOL/L (ref 135–145)
SP GR UR STRIP.AUTO: 1.03
TRIGL SERPL-MCNC: 131 MG/DL (ref 0–149)
TSH SERPL DL<=0.005 MIU/L-ACNC: 2.2 UIU/ML (ref 0.38–5.33)
UROBILINOGEN UR STRIP.AUTO-MCNC: 0.2 MG/DL
WBC # BLD AUTO: 7.7 K/UL (ref 4.8–10.8)
WBC #/AREA URNS HPF: ABNORMAL /HPF

## 2023-09-23 PROCEDURE — 80061 LIPID PANEL: CPT

## 2023-09-23 PROCEDURE — 80053 COMPREHEN METABOLIC PANEL: CPT

## 2023-09-23 PROCEDURE — 36415 COLL VENOUS BLD VENIPUNCTURE: CPT

## 2023-09-23 PROCEDURE — 85520 HEPARIN ASSAY: CPT

## 2023-09-23 PROCEDURE — 85730 THROMBOPLASTIN TIME PARTIAL: CPT

## 2023-09-23 PROCEDURE — 85303 CLOT INHIBIT PROT C ACTIVITY: CPT

## 2023-09-23 PROCEDURE — 81241 F5 GENE: CPT

## 2023-09-23 PROCEDURE — 83036 HEMOGLOBIN GLYCOSYLATED A1C: CPT

## 2023-09-23 PROCEDURE — 85306 CLOT INHIBIT PROT S FREE: CPT

## 2023-09-23 PROCEDURE — 85305 CLOT INHIBIT PROT S TOTAL: CPT

## 2023-09-23 PROCEDURE — 85025 COMPLETE CBC W/AUTO DIFF WBC: CPT

## 2023-09-23 PROCEDURE — 84443 ASSAY THYROID STIM HORMONE: CPT

## 2023-09-23 PROCEDURE — 81001 URINALYSIS AUTO W/SCOPE: CPT

## 2023-09-23 PROCEDURE — 85613 RUSSELL VIPER VENOM DILUTED: CPT

## 2023-09-23 PROCEDURE — 85610 PROTHROMBIN TIME: CPT

## 2023-09-26 LAB
PROT C ACT/NOR PPP: 133 % (ref 83–168)
PROT S AG ACT/NOR PPP IA: 114 % (ref 63–126)
PROT S FREE AG ACT/NOR PPP IA: 107 % (ref 55–123)

## 2023-09-27 LAB — F5 P.R506Q BLD/T QL: NEGATIVE

## 2023-09-28 ENCOUNTER — HOSPITAL ENCOUNTER (OUTPATIENT)
Dept: RADIOLOGY | Facility: MEDICAL CENTER | Age: 33
End: 2023-09-28
Attending: FAMILY MEDICINE
Payer: COMMERCIAL

## 2023-09-28 DIAGNOSIS — N64.4 BREAST PAIN, LEFT: ICD-10-CM

## 2023-09-28 PROCEDURE — 76642 ULTRASOUND BREAST LIMITED: CPT | Mod: LT

## 2023-09-28 PROCEDURE — G0279 TOMOSYNTHESIS, MAMMO: HCPCS
